# Patient Record
Sex: MALE | Race: BLACK OR AFRICAN AMERICAN | Employment: FULL TIME | URBAN - METROPOLITAN AREA
[De-identification: names, ages, dates, MRNs, and addresses within clinical notes are randomized per-mention and may not be internally consistent; named-entity substitution may affect disease eponyms.]

---

## 2019-01-28 ENCOUNTER — HOSPITAL ENCOUNTER (EMERGENCY)
Age: 19
Discharge: HOME | End: 2019-01-28
Attending: EMERGENCY MEDICINE | Admitting: EMERGENCY MEDICINE
Payer: SELF-PAY

## 2019-01-28 ENCOUNTER — APPOINTMENT (OUTPATIENT)
Dept: GENERAL RADIOLOGY | Age: 19
End: 2019-01-28
Attending: EMERGENCY MEDICINE
Payer: SELF-PAY

## 2019-01-28 VITALS
DIASTOLIC BLOOD PRESSURE: 115 MMHG | HEIGHT: 74 IN | RESPIRATION RATE: 16 BRPM | SYSTOLIC BLOOD PRESSURE: 151 MMHG | BODY MASS INDEX: 22.75 KG/M2 | HEART RATE: 98 BPM | WEIGHT: 177.25 LBS | OXYGEN SATURATION: 99 % | TEMPERATURE: 99.1 F

## 2019-01-28 DIAGNOSIS — J02.9 ACUTE PHARYNGITIS, UNSPECIFIED ETIOLOGY: Primary | ICD-10-CM

## 2019-01-28 PROCEDURE — 71046 X-RAY EXAM CHEST 2 VIEWS: CPT

## 2019-01-28 PROCEDURE — 99282 EMERGENCY DEPT VISIT SF MDM: CPT

## 2019-01-28 RX ORDER — IBUPROFEN 600 MG/1
600 TABLET ORAL
Qty: 20 TAB | Refills: 0 | Status: SHIPPED | OUTPATIENT
Start: 2019-01-28

## 2019-01-28 RX ORDER — AMOXICILLIN 500 MG/1
500 TABLET, FILM COATED ORAL 3 TIMES DAILY
Qty: 30 TAB | Refills: 0 | Status: SHIPPED | OUTPATIENT
Start: 2019-01-28

## 2019-01-28 NOTE — ED NOTES
Discharge instructions given to patient by MD Brock Eubanks. Verbalized understanding of instructions. Patient discharged without difficulty. Patient discharged in stable condition via ambulation accompanied by friends.

## 2019-01-28 NOTE — ED PROVIDER NOTES
EMERGENCY DEPARTMENT HISTORY AND PHYSICAL EXAM 
 
 
Date: 1/28/2019 Patient Name: Moncho Pat 
 
History of Presenting Illness Chief Complaint Patient presents with  Sore Throat  
  sore throat since thursday, no fevers reported History Provided By: Patient HPI: Moncho Pat, 22 y.o. male with no significant PMHx, presents ambulatory to the ED with cc of flu like symptoms x 4 days. He reports associated symptoms of a cough, sore throat, and lightheadedness upon \"getting up too fast.\" He states his sore throat is exacerbated with swallowing. Pt had attempted to treat with Nyquil without any relief. He denies any recent sick contacts or a h/o mono. Pt denies any body aches, vomiting, fever, or abdominal pain. Social hx: (+) tobacco, (+) alcohol There are no other complaints, changes, or physical findings at this time. PCP: Mila Rutledge MD 
 
No current facility-administered medications on file prior to encounter. No current outpatient medications on file prior to encounter. Past History Past Medical History: 
History reviewed. No pertinent past medical history. Past Surgical History: 
History reviewed. No pertinent surgical history. Family History: 
History reviewed. No pertinent family history. Social History: 
Social History Tobacco Use  Smoking status: Never Smoker  Smokeless tobacco: Never Used Substance Use Topics  Alcohol use: No  
  Frequency: Never  Drug use: No  
 
 
Allergies: 
No Known Allergies Review of Systems Review of Systems Constitutional: Negative. Negative for chills and fever. HENT: Positive for sore throat. Negative for congestion, rhinorrhea and sinus pressure. Eyes: Negative. Negative for discharge and redness. Respiratory: Positive for cough. Negative for chest tightness and shortness of breath. Cardiovascular: Negative. Negative for chest pain. Gastrointestinal: Negative. Negative for abdominal pain, blood in stool and vomiting. Endocrine: Negative. Genitourinary: Negative. Negative for flank pain and hematuria. Musculoskeletal: Negative. Negative for back pain and myalgias (-generalized body aches). Skin: Negative. Negative for rash. Neurological: Positive for light-headedness. Negative for dizziness, seizures, weakness, numbness and headaches. Hematological: Negative. All other systems reviewed and are negative. Physical Exam  
Physical Exam  
Constitutional: He is oriented to person, place, and time. He appears well-developed and well-nourished. No distress. HENT:  
Head: Normocephalic and atraumatic. Nose: Nose normal.  
Mouth/Throat: No trismus in the jaw. No uvula swelling. Oropharyngeal exudate and posterior oropharyngeal erythema present. No posterior oropharyngeal edema or tonsillar abscesses. Eyes: Conjunctivae and EOM are normal. Pupils are equal, round, and reactive to light. No scleral icterus. Neck: Trachea normal, normal range of motion and phonation normal. Neck supple. No JVD present. No Brudzinski's sign and no Kernig's sign noted. No thyromegaly present. Cardiovascular: Normal rate, regular rhythm, normal heart sounds, intact distal pulses and normal pulses. PMI is not displaced. Exam reveals no gallop and no friction rub. No murmur heard. Pulmonary/Chest: Effort normal and breath sounds normal. No stridor. No respiratory distress. He has no decreased breath sounds. He has no wheezes. He has no rhonchi. He has no rales. He exhibits no tenderness. Abdominal: Soft. Normal aorta and bowel sounds are normal. He exhibits no distension, no abdominal bruit and no mass. There is no hepatosplenomegaly. There is no tenderness. There is no rebound, no guarding and no CVA tenderness. No hernia. Lymphadenopathy:  
  He has cervical adenopathy. Right cervical: Superficial cervical adenopathy present. Left cervical: Superficial cervical adenopathy present. Neurological: He is alert and oriented to person, place, and time. He has normal reflexes. He displays no atrophy and no tremor. No cranial nerve deficit or sensory deficit. He exhibits normal muscle tone. He displays no seizure activity. Coordination normal. GCS eye subscore is 4. GCS verbal subscore is 5. GCS motor subscore is 6. Reflex Scores: 
     Patellar reflexes are 2+ on the right side and 2+ on the left side. Skin: Skin is warm. No rash noted. He is not diaphoretic. No erythema. Nursing note and vitals reviewed. Diagnostic Study Results Labs - No results found for this or any previous visit (from the past 12 hour(s)). Radiologic Studies - CXR Results  (Last 48 hours) 01/28/19 1303  XR CHEST PA LAT Final result Impression:  IMPRESSION:   
   
No significant abnormalities. Narrative:  EXAM:  XR CHEST PA LAT INDICATION:  cough, 4 day history COMPARISON:  None FINDINGS:  
   
PA and lateral radiographs of the chest demonstrate clear lungs. The cardiac  
and mediastinal contours and pulmonary vascularity are normal.   The bones and  
soft tissues are unremarkable. Medical Decision Making I am the first provider for this patient. I reviewed the vital signs, available nursing notes, past medical history, past surgical history, family history and social history. Vital Signs-Reviewed the patient's vital signs. Patient Vitals for the past 12 hrs: 
 Temp Pulse Resp BP SpO2  
01/28/19 1239 99.1 °F (37.3 °C) 98 16 (!) 151/115 99 % Records Reviewed: Nursing Notes, Old Medical Records, Previous Radiology Studies and Previous Laboratory Studies Provider Notes (Medical Decision Making): DIff Dx-influenza,tonsillitis,pharyngitis,mono,peritonsillar abscess Imp/plan-non immunocompromised patient with exudative pharyngitis.  Will treat with Amoxil. Follow PCP as needed. No signs of PTA. ED Course:  
Initial assessment performed. The patients presenting problems have been discussed, and they are in agreement with the care plan formulated and outlined with them. I have encouraged them to ask questions as they arise throughout their visit. Critical Care Time:  
0 Disposition: 
DISCHARGE NOTE 
2:46 PM 
The patient has been re-evaluated and is ready for discharge. Reviewed available results with patient. Counseled patient on diagnosis and care plan. Patient has expressed understanding, and all questions have been answered. Patient agrees with plan and agrees to follow up as recommended, or return to the ED if their symptoms worsen. Discharge instructions have been provided and explained to the patient, along with reasons to return to the ED. PLAN: 
1. Discharge Discharge Medication List as of 1/28/2019  2:46 PM  
  
START taking these medications Details  
amoxicillin 500 mg tab Take 500 mg by mouth three (3) times daily. , Print, Disp-30 Tab, R-0  
  
ibuprofen (MOTRIN) 600 mg tablet Take 1 Tab by mouth every six (6) hours as needed for Pain., Print, Disp-20 Tab, R-0  
  
  
 
2. Follow-up Information Follow up With Specialties Details Why Contact Info Katelin Fitzpatrick MD Internal Medicine, Pulmonary Disease Schedule an appointment as soon as possible for a visit in 1 day If symptoms worsen [address] [facility] [address] 
[phone] [facility] EM[facility]ENC[facility] DE[facility] Emergency Medicine  If symptoms worsen [address] [facility] 
[phone] Gunnar Somers MD Otolaryngology Schedule an appointment as soon as possible for a visit If symptoms worsen [address] [facility] [address] [place] [address] 
[phone] Return to ED if worse Diagnosis Clinical Impression: 1. Acute pharyngitis, unspecified etiology Attestations: This note is prepared by Te Brown, acting as Scribe for Jasmin James MD. Jasmin James MD: The scribe's documentation has been prepared under my direction and personally reviewed by me in its entirety. I confirm that the note above accurately reflects all work, treatment, procedures, and medical decision making performed by me. This note will not be viewable in [address]

## 2019-01-28 NOTE — DISCHARGE INSTRUCTIONS
Patient Education        Sore Throat: Care Instructions  Your Care Instructions    Infection by bacteria or a virus causes most sore throats. Cigarette smoke, dry air, air pollution, allergies, and yelling can also cause a sore throat. Sore throats can be painful and annoying. Fortunately, most sore throats go away on their own. If you have a bacterial infection, your doctor may prescribe antibiotics. Follow-up care is a key part of your treatment and safety. Be sure to make and go to all appointments, and call your doctor if you are having problems. It's also a good idea to know your test results and keep a list of the medicines you take. How can you care for yourself at home? · If your doctor prescribed antibiotics, take them as directed. Do not stop taking them just because you feel better. You need to take the full course of antibiotics. · Gargle with warm salt water once an hour to help reduce swelling and relieve discomfort. Use 1 teaspoon of salt mixed in 1 cup of warm water. · Take an over-the-counter pain medicine, such as acetaminophen (Tylenol), ibuprofen (Advil, Motrin), or naproxen (Aleve). Read and follow all instructions on the label. · Be careful when taking over-the-counter cold or flu medicines and Tylenol at the same time. Many of these medicines have acetaminophen, which is Tylenol. Read the labels to make sure that you are not taking more than the recommended dose. Too much acetaminophen (Tylenol) can be harmful. · Drink plenty of fluids. Fluids may help soothe an irritated throat. Hot fluids, such as tea or soup, may help decrease throat pain. · Use over-the-counter throat lozenges to soothe pain. Regular cough drops or hard candy may also help. These should not be given to young children because of the risk of choking. · Do not smoke or allow others to smoke around you. If you need help quitting, talk to your doctor about stop-smoking programs and medicines.  These can increase your chances of quitting for good. · Use a vaporizer or humidifier to add moisture to your bedroom. Follow the directions for cleaning the machine. When should you call for help? Call your doctor now or seek immediate medical care if:    · You have new or worse trouble swallowing.     · Your sore throat gets much worse on one side.    Watch closely for changes in your health, and be sure to contact your doctor if you do not get better as expected. Where can you learn more? Go to http://dat-natalie.info/. Enter [phone] in the search box to learn more about \"Sore Throat: Care Instructions. \"  Current as of: 2018  Content Version: 11.9  © 0485-2738 [employer]. Care instructions adapted under license by [employer] (which disclaims liability or warranty for this information). If you have questions about a medical condition or this instruction, always ask your healthcare professional. [place] [address] any warranty or liability for your use of this information. SelStor Activation    Thank you for requesting access to [employer]. Please follow the instructions below to securely access and download your online medical record. [employer]Stor allows you to send messages to your doctor, view your test results, renew your prescriptions, schedule appointments, and more. How Do I Sign Up? 1. In your internet browser, go to [email or web address]  2. Click on the First Time User? Click Here link in the Sign In box. You will be redirect to the New Member Sign Up page. 3. Enter your SelStor Access Code exactly as it appears below. You will not need to use this code after youve completed the sign-up process. If you do not sign up before the expiration date, you must request a new code. SelStor Access Code: [email or web address]  Expires: 3/14/2019 12:37 PM (This is the date your SelStor access code will )    4.  Enter the last four digits of your Social Security Number (xxxx) and Date of Birth (mm/dd/yyyy) as indicated and click Submit. You will be taken to the next sign-up page. 5. Create a Nexopia ID. This will be your Nexopia login ID and cannot be changed, so think of one that is secure and easy to remember. 6. Create a Nexopia password. You can change your password at any time. 7. Enter your Password Reset Question and Answer. This can be used at a later time if you forget your password. 8. Enter your e-mail address. You will receive e-mail notification when new information is available in [address] 9. Click Sign Up. You can now view and download portions of your medical record. 10. Click the Download Summary menu link to download a portable copy of your medical information. Additional Information    If you have questions, please visit the Frequently Asked Questions section of the Nexopia website at [email or web address] Remember, Nexopia is NOT to be used for urgent needs. For medical emergencies, dial 911.

## 2021-06-03 ENCOUNTER — HOSPITAL ENCOUNTER (EMERGENCY)
Age: 21
Discharge: HOME | End: 2021-06-03
Attending: STUDENT IN AN ORGANIZED HEALTH CARE EDUCATION/TRAINING PROGRAM

## 2021-06-03 ENCOUNTER — APPOINTMENT (OUTPATIENT)
Dept: GENERAL RADIOLOGY | Age: 21
End: 2021-06-03
Attending: STUDENT IN AN ORGANIZED HEALTH CARE EDUCATION/TRAINING PROGRAM

## 2021-06-03 VITALS
HEIGHT: 74 IN | DIASTOLIC BLOOD PRESSURE: 89 MMHG | BODY MASS INDEX: 22.46 KG/M2 | WEIGHT: 175.04 LBS | TEMPERATURE: 98.4 F | HEART RATE: 59 BPM | SYSTOLIC BLOOD PRESSURE: 148 MMHG | RESPIRATION RATE: 16 BRPM | OXYGEN SATURATION: 100 %

## 2021-06-03 DIAGNOSIS — J98.8 CONGESTION OF UPPER AIRWAY: Primary | ICD-10-CM

## 2021-06-03 LAB
ATRIAL RATE: 54 BPM
CALCULATED P AXIS, ECG09: -2 DEGREES
CALCULATED R AXIS, ECG10: 72 DEGREES
CALCULATED T AXIS, ECG11: 46 DEGREES
DIAGNOSIS, 93000: NORMAL
P-R INTERVAL, ECG05: 142 MS
Q-T INTERVAL, ECG07: 398 MS
QRS DURATION, ECG06: 100 MS
QTC CALCULATION (BEZET), ECG08: 377 MS
VENTRICULAR RATE, ECG03: 54 BPM

## 2021-06-03 PROCEDURE — 93005 ELECTROCARDIOGRAM TRACING: CPT

## 2021-06-03 PROCEDURE — 99283 EMERGENCY DEPT VISIT LOW MDM: CPT

## 2021-06-03 PROCEDURE — 71045 X-RAY EXAM CHEST 1 VIEW: CPT

## 2021-06-03 NOTE — ED PROVIDER NOTES
EMERGENCY DEPARTMENT HISTORY AND PHYSICAL EXAM      Date: 6/3/2021  Patient Name: Eduardo Park    History of Presenting Illness     Chief Complaint   Patient presents with    Nasal Congestion     cough and cold sx with body aches since yesterday. Today when he went to the bathroom, \"the world went dark, and I passed out. \"    Cough    Syncope         HPI: Eduardo Park, 24 y.o. male presents to the ED with cc of cough, congestion, chills for the past today. He reports associated poor appetite. This morning, he went to urinate, then 1 walking out of the bathroom became lightheaded and passed out. He fell against the shower curtain, did not hit his head. He denies any neck or back pain, no weakness numbness or tingling in the arms or legs, no chest pain or shortness of breath prior to the syncope. He currently denies pain. He denies any fevers, no abdominal pain, vomiting or diarrhea. There are no other complaints, changes, or physical findings at this time. PCP: Kira Mackenzie MD    No current facility-administered medications on file prior to encounter. Current Outpatient Medications on File Prior to Encounter   Medication Sig Dispense Refill    amoxicillin 500 mg tab Take 500 mg by mouth three (3) times daily. 30 Tab 0    ibuprofen (MOTRIN) 600 mg tablet Take 1 Tab by mouth every six (6) hours as needed for Pain.  20 Tab 0       Past History     Past Medical History:  No past medical history    Past Surgical History:  Reviewed and nonpertinent    Family History:  Reviewed and nonpertinent    Social History:  Social History     Tobacco Use    Smoking status: Never Smoker    Smokeless tobacco: Never Used   Substance Use Topics    Alcohol use: No    Drug use: No       Allergies:  No Known Allergies      Review of Systems   no fever  No eye pain  No ear pain  no shortness of breath  no chest pain  no abdominal pain  no dysuria  no leg pain  No rash  No lymphadenopathy  No weight loss    Physical Exam   Physical Exam  Constitutional:       General: He is not in acute distress. HENT:      Head: Normocephalic and atraumatic. Eyes:      Extraocular Movements: Extraocular movements intact. Cardiovascular:      Rate and Rhythm: Normal rate and regular rhythm. Pulmonary:      Effort: Pulmonary effort is normal.   Abdominal:      Palpations: Abdomen is soft. Tenderness: There is no abdominal tenderness. Musculoskeletal:         General: No swelling or tenderness. Cervical back: Neck supple. No tenderness. Skin:     General: Skin is warm and dry. Neurological:      General: No focal deficit present. Mental Status: He is alert and oriented to person, place, and time. Comments: 5/5 strength with shoulder flexion bilaterally, 5/5 strength with ankle flexion and extension bilaterally. Sensation to light touch intact over upper and lower extremities bilaterally. Psychiatric:         Mood and Affect: Mood normal.         Diagnostic Study Results     Labs -     Recent Results (from the past 24 hour(s))   EKG, 12 LEAD, INITIAL    Collection Time: 06/03/21  3:00 PM   Result Value Ref Range    Ventricular Rate 54 BPM    Atrial Rate 54 BPM    P-R Interval 142 ms    QRS Duration 100 ms    Q-T Interval 398 ms    QTC Calculation (Bezet) 377 ms    Calculated P Axis -2 degrees    Calculated R Axis 72 degrees    Calculated T Axis 46 degrees    Diagnosis       Sinus bradycardia with sinus arrhythmia  Minimal voltage criteria for LVH, may be normal variant  No previous ECGs available         Radiologic Studies -   XR CHEST PORT   Final Result   No Acute Disease. CT Results  (Last 48 hours)    None        CXR Results  (Last 48 hours)               06/03/21 1517  XR CHEST PORT Final result    Impression:  No Acute Disease. Narrative:  EXAM: Portable CXR. 1507 hours. INDICATION: cough, chest congestion       FINDINGS:   The lungs appear clear.  Heart is normal in size. There is no pulmonary edema. There is no evident pneumothorax or pleural effusion. Medical Decision Making   I am the first provider for this patient. I reviewed the vital signs, available nursing notes, past medical history, past surgical history, family history and social history. Vital Signs-Reviewed the patient's vital signs. Patient Vitals for the past 24 hrs:   Temp Pulse Resp BP SpO2   06/03/21 1555 98.4 °F (36.9 °C) (!) 59 16 (!) 148/89 100 %   06/03/21 1455 98 °F (36.7 °C) (!) 49 16 (!) 151/84 99 %         Provider Notes (Medical Decision Making):   19-year-old presenting with cough, congestion and syncope. Concern for likely URI, chest x-ray to assess for pneumonia. He is afebrile, nontoxic-appearing. Syncope likely in setting of possible vasovagal symptoms, orthostatic hypotension, EKG to assess for arrhythmia. With reassuring vital signs, well-hydrated appearing on exam, no concern for any significant electrolyte or metabolic abnormalities. ED Course:     Initial assessment performed. The patients presenting problems have been discussed, and they are in agreement with the care plan formulated and outlined with them. I have encouraged them to ask questions as they arise throughout their visit. EKG is performed at 15: 00, shows sinus bradycardia rate of 54, , , QTc 377. Axis upright, no ST segment elevation or depression concerning for ACS, there is sinus arrhythmia, this is interpreted as sinus bradycardia with sinus arrhythmia. Chest x-ray is unremarkable. On reevaluation, patient resting comfortably, tolerates p.o. and ambulates without difficulty. Patient is counseled on supportive care and return precautions. Will return to the ED for any worsening coughing, fevers, shortness of breath, chest pain, any further syncope or lightheadedness. Will followup with primary care doctor within 7 days.     Critical Care Time: Disposition:  Home    PLAN:  1. Current Discharge Medication List        2.    Follow-up Information     Follow up With Specialties Details Why Contact Info    Le Mobley MD Internal Medicine, Pulmonary Disease Call in 3 days  [address] [phone]      [facility] EMERGENCY DEPT Emergency Medicine  As needed, If symptoms worsen [address]  [address] [facility]  [phone]        Return to ED if worse     Diagnosis     Clinical Impression: Acute URI, acute syncope

## 2021-06-03 NOTE — LETTER
NOTIFICATION RETURN TO WORK / SCHOOL 
 
6/3/2021 4:58 PM 
 
Mr. [address] [place] [address] [address] To Whom It May Concern: 
 
Ramon Montemayor was seen today 06/03/2021 at  [facility] EMERGENCY DEPT. He will return to work/school on 06/05/2021 If there are questions or concerns please have the patient contact our office. Sincerely, Ronna Jimenez MD  
[phone]

## 2024-06-02 ENCOUNTER — HOSPITAL ENCOUNTER (EMERGENCY)
Facility: HOSPITAL | Age: 24
Discharge: HOME | End: 2024-06-02
Attending: EMERGENCY MEDICINE
Payer: COMMERCIAL

## 2024-06-02 VITALS
RESPIRATION RATE: 18 BRPM | DIASTOLIC BLOOD PRESSURE: 88 MMHG | HEART RATE: 52 BPM | BODY MASS INDEX: 23.57 KG/M2 | WEIGHT: 183.64 LBS | OXYGEN SATURATION: 99 % | HEIGHT: 74 IN | SYSTOLIC BLOOD PRESSURE: 150 MMHG | TEMPERATURE: 98.3 F

## 2024-06-02 DIAGNOSIS — J02.9 ACUTE PHARYNGITIS, UNSPECIFIED ETIOLOGY: Primary | ICD-10-CM

## 2024-06-02 LAB
COMMENT:: NORMAL
S PYO AG THROAT QL: NEGATIVE
SPECIMEN HOLD: NORMAL

## 2024-06-02 PROCEDURE — 99284 EMERGENCY DEPT VISIT MOD MDM: CPT

## 2024-06-02 PROCEDURE — 87070 CULTURE OTHR SPECIMN AEROBIC: CPT

## 2024-06-02 PROCEDURE — 96372 THER/PROPH/DIAG INJ SC/IM: CPT

## 2024-06-02 PROCEDURE — 87880 STREP A ASSAY W/OPTIC: CPT

## 2024-06-02 PROCEDURE — 6360000002 HC RX W HCPCS

## 2024-06-02 RX ORDER — KETOROLAC TROMETHAMINE 30 MG/ML
30 INJECTION, SOLUTION INTRAMUSCULAR; INTRAVENOUS
Status: COMPLETED | OUTPATIENT
Start: 2024-06-02 | End: 2024-06-02

## 2024-06-02 RX ADMIN — KETOROLAC TROMETHAMINE 30 MG: 30 INJECTION, SOLUTION INTRAMUSCULAR at 21:17

## 2024-06-02 ASSESSMENT — PAIN DESCRIPTION - DESCRIPTORS: DESCRIPTORS: SORE

## 2024-06-02 ASSESSMENT — PAIN - FUNCTIONAL ASSESSMENT
PAIN_FUNCTIONAL_ASSESSMENT: 0-10
PAIN_FUNCTIONAL_ASSESSMENT: PREVENTS OR INTERFERES SOME ACTIVE ACTIVITIES AND ADLS

## 2024-06-02 ASSESSMENT — PAIN DESCRIPTION - LOCATION: LOCATION: THROAT

## 2024-06-02 ASSESSMENT — PAIN SCALES - GENERAL: PAINLEVEL_OUTOF10: 5

## 2024-06-02 ASSESSMENT — PAIN DESCRIPTION - ORIENTATION: ORIENTATION: RIGHT;LEFT

## 2024-06-03 NOTE — ED TRIAGE NOTES
Patient arrives to ED reports headache and sore throat starting this morning with cold chills this evening.

## 2024-06-03 NOTE — DISCHARGE INSTRUCTIONS
Take ibuprofen and tylenol as needed for pain. Make sure to take ibuprofen with food.     Drink plenty of fluids and stay well hydrated.     Review the throat culture on MyChart.     Follow-up closely with your primary care provider for reevaluation. Call for appointment as soon as possible.

## 2024-06-03 NOTE — ED PROVIDER NOTES
[facility] EMERGENCY DEP  EMERGENCY DEPARTMENT ENCOUNTER      Pt Name: Justin Chadwick  MRN: [id number]  Birthdate 2000  Date of evaluation: 6/2/2024  Provider: EDMUNDO Jimenez NP    CHIEF COMPLAINT       Chief Complaint   Patient presents with    Headache    Sore Throat         HISTORY OF PRESENT ILLNESS   (Location/Symptom, Timing/Onset, Context/Setting, Quality, Duration, Modifying Factors, Severity)  Note limiting factors.   Justin Chadwick is a 24 y.o. male presenting to the ED c/o sore throat, chills, and headache upon waking up today. + rhinorrhea. + nausea.     Denies cough, sick contacts at home, vomiting, diarrhea, taking med for symptoms, fevers.     Medical hx: bronchitis  Surgical hx: denies  Social hx: admits to smoking, denies etoh, denies marijuana, denies drugs.     The history is provided by the patient. No  was used.         Review of External Medical Records:     Nursing Notes were reviewed.    REVIEW OF SYSTEMS    (2-9 systems for level 4, 10 or more for level 5)     Review of Systems   Constitutional:  Positive for chills. Negative for activity change, appetite change and fever.   HENT:  Positive for rhinorrhea and sore throat.    Respiratory:  Negative for cough and shortness of breath.    Cardiovascular:  Negative for chest pain.   Gastrointestinal:  Positive for nausea. Negative for abdominal pain, diarrhea and vomiting.   Skin:  Negative for rash.   Neurological:  Positive for headaches. Negative for speech difficulty and weakness.   All other systems reviewed and are negative.      Except as noted above the remainder of the review of systems was reviewed and negative.       PAST MEDICAL HISTORY   No past medical history on file.      SURGICAL HISTORY     No past surgical history on file.      CURRENT MEDICATIONS       There are no discharge medications for this patient.      ALLERGIES     Patient has no known allergies.    FAMILY HISTORY     No family history on

## 2024-06-04 LAB
BACTERIA SPEC CULT: NORMAL
SERVICE CMNT-IMP: NORMAL